# Patient Record
Sex: MALE | Race: WHITE | NOT HISPANIC OR LATINO | Employment: FULL TIME | URBAN - NONMETROPOLITAN AREA
[De-identification: names, ages, dates, MRNs, and addresses within clinical notes are randomized per-mention and may not be internally consistent; named-entity substitution may affect disease eponyms.]

---

## 2019-04-30 ENCOUNTER — OFFICE VISIT (OUTPATIENT)
Dept: NEUROLOGY | Facility: CLINIC | Age: 54
End: 2019-04-30

## 2019-04-30 VITALS
BODY MASS INDEX: 30.94 KG/M2 | OXYGEN SATURATION: 96 % | HEART RATE: 102 BPM | DIASTOLIC BLOOD PRESSURE: 78 MMHG | SYSTOLIC BLOOD PRESSURE: 120 MMHG | WEIGHT: 221 LBS | HEIGHT: 71 IN

## 2019-04-30 DIAGNOSIS — G47.33 OSA (OBSTRUCTIVE SLEEP APNEA): Primary | ICD-10-CM

## 2019-04-30 DIAGNOSIS — G47.19 EXCESSIVE DAYTIME SLEEPINESS: ICD-10-CM

## 2019-04-30 PROCEDURE — 99203 OFFICE O/P NEW LOW 30 MIN: CPT | Performed by: NURSE PRACTITIONER

## 2019-04-30 RX ORDER — OXYMETAZOLINE HYDROCHLORIDE 0.05 G/100ML
SPRAY NASAL
COMMUNITY

## 2019-05-16 ENCOUNTER — TELEPHONE (OUTPATIENT)
Dept: NEUROLOGY | Facility: CLINIC | Age: 54
End: 2019-05-16

## 2019-06-10 ENCOUNTER — APPOINTMENT (OUTPATIENT)
Dept: SLEEP MEDICINE | Facility: HOSPITAL | Age: 54
End: 2019-06-10

## 2021-09-17 ENCOUNTER — LAB (OUTPATIENT)
Dept: LAB | Facility: HOSPITAL | Age: 56
End: 2021-09-17

## 2021-09-17 ENCOUNTER — TRANSCRIBE ORDERS (OUTPATIENT)
Dept: LAB | Facility: HOSPITAL | Age: 56
End: 2021-09-17

## 2021-09-17 DIAGNOSIS — Z20.822 COVID-19 RULED OUT: Primary | ICD-10-CM

## 2021-09-17 DIAGNOSIS — Z20.822 COVID-19 RULED OUT: ICD-10-CM

## 2021-09-17 PROCEDURE — U0004 COV-19 TEST NON-CDC HGH THRU: HCPCS

## 2021-09-17 PROCEDURE — C9803 HOPD COVID-19 SPEC COLLECT: HCPCS

## 2021-09-18 LAB — SARS-COV-2 RNA NOSE QL NAA+PROBE: NOT DETECTED

## 2021-10-08 ENCOUNTER — OFFICE VISIT (OUTPATIENT)
Dept: UROLOGY | Facility: CLINIC | Age: 56
End: 2021-10-08

## 2021-10-08 VITALS
BODY MASS INDEX: 30.94 KG/M2 | WEIGHT: 221 LBS | HEIGHT: 71 IN | SYSTOLIC BLOOD PRESSURE: 126 MMHG | TEMPERATURE: 96.6 F | DIASTOLIC BLOOD PRESSURE: 70 MMHG | HEART RATE: 78 BPM | OXYGEN SATURATION: 96 %

## 2021-10-08 DIAGNOSIS — R97.20 ELEVATED PROSTATE SPECIFIC ANTIGEN (PSA): Primary | ICD-10-CM

## 2021-10-08 DIAGNOSIS — A63.0 GENITAL WARTS: ICD-10-CM

## 2021-10-08 LAB
BILIRUB BLD-MCNC: NEGATIVE MG/DL
CLARITY, POC: CLEAR
COLOR UR: YELLOW
EXPIRATION DATE: NORMAL
GLUCOSE UR STRIP-MCNC: NEGATIVE MG/DL
KETONES UR QL: NEGATIVE
LEUKOCYTE EST, POC: NEGATIVE
Lab: NORMAL
NITRITE UR-MCNC: NEGATIVE MG/ML
PH UR: 5.5 [PH] (ref 5–8)
PROT UR STRIP-MCNC: NEGATIVE MG/DL
RBC # UR STRIP: NEGATIVE /UL
SP GR UR: 1.02 (ref 1–1.03)
UROBILINOGEN UR QL: NORMAL

## 2021-10-08 PROCEDURE — 99203 OFFICE O/P NEW LOW 30 MIN: CPT | Performed by: UROLOGY

## 2021-10-08 RX ORDER — MOMETASONE FUROATE 50 UG/1
SPRAY, METERED NASAL
COMMUNITY
Start: 2021-09-29

## 2021-10-08 NOTE — PROGRESS NOTES
Chief Complaint  Elevated PSA    Referring Provider  Ange Caceres NP-C    HPI  Mr. Zhong is a 56 y.o.  male who presents with an elevated PSA to 4.7 on 8/29/21 .      Patient complains of nocturia.  His IPSS score is 3.     Patient denies fevers, chills, nausea, vomiting, constipation, or flank pain.  Past  history is negative for BPH, frequent UTIs, gross hematuria, stones or other renal diseases.     He denies shortness of breath, leg swelling, calf pain or bone pain.    Past Medical History  Past Medical History:   Diagnosis Date   • Eczema    • Hyperlipidemia    • Ringing in the ears, bilateral        Past Surgical History  Past Surgical History:   Procedure Laterality Date   • ACHILLES TENDON REPAIR     • CHOLECYSTECTOMY     • HERNIA REPAIR      two reported       Medications    Current Outpatient Medications:   •  mometasone (NASONEX) 50 MCG/ACT nasal spray, 2 SPRAYS IN EACH NOSTRIL NASALLY ONCE A DAY 30 DAY(S), Disp: , Rfl:   •  oxymetazoline (AFRIN) 0.05 % nasal spray, into the nostril(s) as directed by provider., Disp: , Rfl:     Allergies  No Known Allergies    Social History  Social History     Tobacco Use   • Smoking status: Never Smoker   • Smokeless tobacco: Current User     Types: Snuff   Vaping Use   • Vaping Use: Never used   Substance Use Topics   • Alcohol use: Yes     Comment: occ   • Drug use: No       Family History  Family History   Problem Relation Age of Onset   • Heart disease Mother    • Hypotension Mother    • Heart disease Father    • Hypertension Father    • Heart disease Maternal Grandmother    • Cancer Maternal Grandmother    • Heart disease Paternal Grandmother    • Hypertension Paternal Grandmother    • Heart disease Paternal Grandfather    • Hypertension Paternal Grandfather       He has no family history of prostate cancer.      Physical Exam  Visit Vitals  /70 (BP Location: Right arm, Patient Position: Sitting, Cuff Size: Adult)   Pulse 78   Temp 96.6 °F  "(35.9 °C)   Ht 180.3 cm (71\")   Wt 100 kg (221 lb)   SpO2 96%   BMI 30.82 kg/m²     A physical exam was notable for normal habitus.    Genitourinary  Penis: circumcised penis, glans normal, no penile discharge.  No rashes/lesions.    Testes: descended bilaterally, no masses, nontender to palpation. Remainder of scrotal contents normal. No hernia appreciated.  Perineum:  No perineal pain with palpation.  Rectal:  Normal tone, multiple genital warts present  Prostate:  30 grams.  Symmetric, non-tender, anodular and no induration.      Labs  Brief Urine Lab Results  (Last result in the past 365 days)      Color   Clarity   Blood   Leuk Est   Nitrite   Protein   CREAT   Urine HCG        10/08/21 1004 Yellow Clear Negative Negative Negative Negative               Lab Results   Component Value Date    PSA 1.22 08/11/2014         Assessment  Mr. Zhong is a 56 y.o.  male who presents with elevated PSA.  This is a new diagnosis of uncertain clinical prognosis.    I explained to the patient that an elevated PSA is a marker of risk of prostate cancer and a prostate biopsy would be the next step in diagnosis. I explained that sampling error can occur with any biopsy and there is a risk of potentially missing a cancer that may be present.    I discussed the risks, benefits, and alternatives to prostate biopsy, including hematuria, hematochezia, and hematospermia.  I also discussed the risk of diagnosing a clinically-insignificant prostate cancer.  I discussed the risks of sepsis, which can be minimized by prophylactic antibiotics.       Plan  1. Recheck PSA   "

## 2021-10-18 ENCOUNTER — LAB (OUTPATIENT)
Dept: LAB | Facility: HOSPITAL | Age: 56
End: 2021-10-18

## 2021-10-18 DIAGNOSIS — R97.20 ELEVATED PROSTATE SPECIFIC ANTIGEN (PSA): ICD-10-CM

## 2021-10-18 PROCEDURE — 84153 ASSAY OF PSA TOTAL: CPT

## 2021-10-18 PROCEDURE — 36415 COLL VENOUS BLD VENIPUNCTURE: CPT

## 2021-10-19 LAB — PSA SERPL-MCNC: 4.43 NG/ML (ref 0–4)

## 2021-10-29 ENCOUNTER — OFFICE VISIT (OUTPATIENT)
Dept: UROLOGY | Facility: CLINIC | Age: 56
End: 2021-10-29

## 2021-10-29 DIAGNOSIS — R97.20 ELEVATED PSA: Primary | ICD-10-CM

## 2021-10-29 PROCEDURE — 99442 PR PHYS/QHP TELEPHONE EVALUATION 11-20 MIN: CPT | Performed by: UROLOGY

## 2021-10-29 RX ORDER — CIPROFLOXACIN 500 MG/1
500 TABLET, FILM COATED ORAL 2 TIMES DAILY
Qty: 6 TABLET | Refills: 0 | Status: SHIPPED | OUTPATIENT
Start: 2021-10-29

## 2021-10-29 RX ORDER — CEPHALEXIN 500 MG/1
500 CAPSULE ORAL 2 TIMES DAILY
Qty: 6 CAPSULE | Refills: 0 | Status: SHIPPED | OUTPATIENT
Start: 2021-10-29 | End: 2021-11-01

## 2021-10-29 RX ORDER — MAGNESIUM HYDROXIDE 1200 MG/15ML
1 LIQUID ORAL ONCE
Qty: 1 ENEMA | Refills: 0 | Status: SHIPPED | OUTPATIENT
Start: 2021-10-29 | End: 2021-10-29

## 2021-10-29 NOTE — PROGRESS NOTES
Lab Results   Component Value Date    PSA 4.430 (H) 10/18/2021    PSA 1.22 08/11/2014         Assessment  Mr. Zhong is a 56 y.o.  male who presents with elevated PSA.  I had a 15-minute telephone conversation with the patient at home and myself in the car about his repeat PSA continues to be elevated.    I explained to the patient that an elevated PSA is a marker of risk of prostate cancer and a prostate biopsy would be the next step in diagnosis. I explained that sampling error can occur with any biopsy and there is a risk of potentially missing a cancer that may be present.    I discussed the risks, benefits, and alternatives to prostate biopsy, including hematuria, hematochezia, and hematospermia.  I also discussed the risk of diagnosing a clinically-insignificant prostate cancer.  I discussed the risks of sepsis, which can be minimized by prophylactic antibiotics.       Plan  1. I have recommended TRUS biopsy of prostate  2. I provided a prescription for Ciprofloxacin 500mg PO BID and Keflex 500mg PO BID for 3 days to start the day prior to biopsy.    You have chosen to receive care through a telephone visit. Do you consent to use a telephone visit for your medical care today? Yes

## 2021-11-02 ENCOUNTER — TELEPHONE (OUTPATIENT)
Dept: UROLOGY | Facility: CLINIC | Age: 56
End: 2021-11-02

## 2021-11-02 NOTE — TELEPHONE ENCOUNTER
I returned patient phone call and pt stated he has concerns because his friend has prostate cancer and he is doing imaging with dye to prevent cancer cells from spreading per patient. Patient wants to know if he can do some other testing because if he has cancer he does not want it to spread. I tried to explain to the patient that the biopsy is how we find out if there is cancer and it wouldn't cause cancer to spread to the other areas of his body by doing a biopsy. Please Advise.

## 2021-11-02 NOTE — TELEPHONE ENCOUNTER
Caller: OSBALDO FERNANDEZ    Relationship: Emergency Contact    Best call back number: [phone]    What is the best time to reach you: ANYTIME     Who are you requesting to speak with (clinical staff, provider,  specific staff member): CLINICAL STAFF OR DR. NAM    PTS WIFE WOULD LIKE A CALL BACK TO DISCUSS BIOPSY. THEY WOULD LIKE TO KNOW IF IT WOULD BE BETTER TO DO AN MRI SCAN EVERY 6 MONTHS INSTEAD TO AVOID INTRODUCING THE CANCER CELLS OUTSIDE OF THE BODY.     ALSO MR FERNANDEZ' PRESCRIPTIONS WERE ALL SENT INTO THE [facility] YESTERDAY. IF THE PT IS NEEDING THE BIOPSY AND NEEDS TO COMPLETE THESE MEDS BEFOREHAND THEY NEED TO BE SENT OVER TO [facility] [address] [place] [id number]

## 2021-11-03 NOTE — TELEPHONE ENCOUNTER
I do not see that patient is scheduled for biopsy with me.  We should either schedule him with biopsy as I previously recommended or he can do a telephone visit with me prior to scheduling and I can discuss this; I would be more than happy to explain it to him.

## 2021-11-08 ENCOUNTER — TELEPHONE (OUTPATIENT)
Dept: UROLOGY | Facility: CLINIC | Age: 56
End: 2021-11-08

## 2021-11-17 ENCOUNTER — TELEPHONE (OUTPATIENT)
Dept: UROLOGY | Facility: CLINIC | Age: 56
End: 2021-11-17

## 2021-11-17 NOTE — TELEPHONE ENCOUNTER
Caller: Tangela Zhong    Relationship: Self    Best call back number: [phone]    What is the best time to reach you: ANYTIME    Who are you requesting to speak with (clinical staff, provider,  specific staff member): DR. NAM OR HIS MA    What was the call regarding: PTS WIFE CALLED IN AGAIN STATING PT WOULD LIKE TO CHANGE OR MOVE UP HIS TELEPHONE VISIT WITH DR. NAM SO THEY COULD MOVE FORWARD WITH SCHED PTS BIOPSY. SPOKE WITH LOIS AT THE  CHANGED TELE HEALTH APPT. UPON INFORMING THE WIFE SHE STATED HER  NO LONGER HAS CONCERNS HE NEEDS TO DISCUSS WITH DR. NAM AND DOES NOT WANT A TELE HEALTH VISIT ANYMORE. PT WOULD JUST LIKE TO PROCEED WITH SCHEDULING THE BIOPSY AS SOON AS POSSIBLE.

## 2021-12-06 ENCOUNTER — PROCEDURE VISIT (OUTPATIENT)
Dept: UROLOGY | Facility: CLINIC | Age: 56
End: 2021-12-06

## 2021-12-06 VITALS — RESPIRATION RATE: 18 BRPM | BODY MASS INDEX: 30.94 KG/M2 | HEIGHT: 71 IN | WEIGHT: 221 LBS

## 2021-12-06 DIAGNOSIS — R97.20 ELEVATED PSA: Primary | ICD-10-CM

## 2021-12-06 PROCEDURE — 55700 PR PROSTATE NEEDLE BIOPSY ANY APPROACH: CPT | Performed by: UROLOGY

## 2021-12-06 PROCEDURE — 76872 US TRANSRECTAL: CPT | Performed by: UROLOGY

## 2021-12-06 RX ORDER — CEPHALEXIN 500 MG/1
CAPSULE ORAL
COMMUNITY
Start: 2021-12-03

## 2021-12-06 NOTE — PROGRESS NOTES
TRUS BIOPSY OF PROSTATE    Preoperative diagnosis  Elevated PSA    Postoperative diagnosis  Elevated PSA    Procedure  1.  Transrectal ultrasound of the prostate  2.  Transrectal ultrasound guidance of needle biopsy  3.  Prostate biopsy    Attending Surgeon  Parish Guerra MD    Anesthesia  2% lidocaine jelly, intrarectal instillation, 10mL  1% lidocaine solution, periprostatic injection, 10mL    Complications  None    Specimen  Prostate biopsy x 14    Indications  Mr. Zhong is a 56 y.o. year old male with an elevated PSA:   Lab Results   Component Value Date    PSA 4.430 (H) 10/18/2021    PSA 1.22 08/11/2014           After discussing his options, the patient decided to proceed with prostate biopsy.  Informed consent was obtained. Possible complications were discussed with the patient during his last visit including, but not limited to, hematuria, hematochezia, prostatitis, urinary tract infection, sepsis, and urinary retention.  He did start the prescribed oral antibiotic regimen.    Procedure  The patient was positioned and prepped in a left lateral position with lower extremities flexed.  Lidocaine jelly, 2%, was injected per rectum. A digital rectal exam was performed.The [employer] rectal ultrasound probe was slowly introduced into the rectum without difficulty.  The prostate and seminal vesicles were inspected systematically using cross and sagittal views with the ultrasound.  The dimensions of the prostate were measured, for a calculated volume of 29 mL.  Using a true cut 14 Fr biopsy needle, 14 prostate cores were collected. The specific locations were the following: left lateral base, left lateral mid, left lateral apex, left medial base, left medial mid, and left medial apex, right lateral base, right lateral mid, right lateral apex, right medial base, right medial mid, right medial apex, and right and left transition zones. The rectal ultrasound probe was removed.  A RIGO was again performed revealing  little blood in the rectum.  The patient tolerated the procedure well.    Plan  1.  The patient was instructed to drink plenty of fluids and warned about possible complications and side effects including, but not limited to, blood in the urine, stool and semen as well as bloodstream infection.  He was instructed to call the office if there are any issues, especially fevers or flu-like symptoms.    2.  Continue antibiotic for a total of 3 days.  3.  The patient will return to clinic in approximately 1 week for discussion of the pathological report.

## 2021-12-16 ENCOUNTER — OFFICE VISIT (OUTPATIENT)
Dept: UROLOGY | Facility: CLINIC | Age: 56
End: 2021-12-16

## 2021-12-16 DIAGNOSIS — R97.20 ELEVATED PSA: Primary | ICD-10-CM

## 2021-12-16 PROCEDURE — 99442 PR PHYS/QHP TELEPHONE EVALUATION 11-20 MIN: CPT | Performed by: UROLOGY

## 2022-05-02 ENCOUNTER — TELEPHONE (OUTPATIENT)
Dept: UROLOGY | Facility: CLINIC | Age: 57
End: 2022-05-02

## 2022-05-02 NOTE — TELEPHONE ENCOUNTER
Caller: JUVENCIO    Relationship: Provider    Best call back number: [phone]    What is the best time to reach you: ANY    Who are you requesting to speak with clinical staff    Do you know the name of the person who called: JUVENCIO WITH [facility] [phone]    What was the call regarding: [facility] CALLED TO HAVE PT A SOONER APPT THAN THE RECOMMENDED 1YR FOLLOW UP    Do you require a callback: YES

## 2022-05-06 ENCOUNTER — OFFICE VISIT (OUTPATIENT)
Dept: UROLOGY | Facility: CLINIC | Age: 57
End: 2022-05-06

## 2022-05-06 VITALS
HEART RATE: 86 BPM | HEIGHT: 71 IN | WEIGHT: 221 LBS | DIASTOLIC BLOOD PRESSURE: 84 MMHG | BODY MASS INDEX: 30.94 KG/M2 | SYSTOLIC BLOOD PRESSURE: 122 MMHG | TEMPERATURE: 97.5 F | OXYGEN SATURATION: 96 %

## 2022-05-06 DIAGNOSIS — R97.20 ELEVATED PSA: Primary | ICD-10-CM

## 2022-05-06 PROCEDURE — 99213 OFFICE O/P EST LOW 20 MIN: CPT | Performed by: UROLOGY

## 2022-05-06 RX ORDER — B-COMPLEX WITH VITAMIN C
TABLET ORAL DAILY
COMMUNITY

## 2022-05-06 RX ORDER — RIBOFLAVIN (VITAMIN B2) 100 MG
100 TABLET ORAL DAILY
COMMUNITY

## 2022-05-06 NOTE — PROGRESS NOTES
Chief Complaint   Patient presents with   • Elevated PSA     6 month follow up.        HPI  Ms. Zhong is a 57 y.o. male with history below in assessment, who presents for follow up.     IPSS Questionnaire (AUA-7):  Incomplete emptying  Over the past month, how often have you had a sensation of not emptying your bladder completely after you finish?: Less than 1 time in 5 (05/06/22 1054)  Frequency  Over the past month, how often have you had to urinate again less than two hours after you finishing urinating ?: Less than 1 time in 5 (05/06/22 1054)  Intermittency  Over the past month, how often have you found you stopped and started again several time when you urinated ?: Not at all (05/06/22 1054)  Urgency  Over the last month, how difficult  have you found it to postpone urination ?: Less than 1 time in 5 (05/06/22 1054)  Weak Stream  Over the past month, how often have you had a weak urinary stream ?: Not at all (05/06/22 1054)  Straining  Over the past month, how often have you had to push or strain to begin urination ?: Not at all (05/06/22 1054)  Nocturia  Over the past month, how many times did you most typically get up to urinate from the time you went to bed until the time you got up in the morning ?: Less than 1 time in 5 (05/06/22 1054)  Quality of life due to urinary symptoms  If you were to spend the rest of your life with your urinary condition the way it is now, how would feel about that?: Pleased (05/06/22 1054)    Scores  Total IPSS Score: (!) 4 (05/06/22 1054)  Total Score = Symtomatic Level: Mildly symptomatic: 0-7 (05/06/22 1054)         Past Medical History:   Diagnosis Date   • Eczema    • Hyperlipidemia    • Ringing in the ears, bilateral        Past Surgical History:   Procedure Laterality Date   • ACHILLES TENDON REPAIR      right   • CHOLECYSTECTOMY     • HERNIA REPAIR      two reported         Current Outpatient Medications:   •  ascorbic acid (VITAMIN C) 100 MG tablet, Take 100 mg by mouth  "Daily., Disp: , Rfl:   •  Magnesium 100 MG tablet, Take  by mouth Daily., Disp: , Rfl:   •  mometasone (NASONEX) 50 MCG/ACT nasal spray, 2 SPRAYS IN EACH NOSTRIL NASALLY ONCE A DAY 30 DAY(S), Disp: , Rfl:   •  Zinc 100 MG tablet, Take  by mouth Daily., Disp: , Rfl:   •  cephalexin (KEFLEX) 500 MG capsule, , Disp: , Rfl:   •  ciprofloxacin (Cipro) 500 MG tablet, Take 1 tablet by mouth 2 (Two) Times a Day. Start taking the day prior to biopsy, Disp: 6 tablet, Rfl: 0  •  oxymetazoline (AFRIN) 0.05 % nasal spray, into the nostril(s) as directed by provider., Disp: , Rfl:      Physical Exam  Visit Vitals  /84 (BP Location: Left arm, Patient Position: Sitting, Cuff Size: Adult)   Pulse 86   Temp 97.5 °F (36.4 °C) (Infrared)   Ht 180.3 cm (70.98\")   Wt 100 kg (221 lb)   SpO2 96%   BMI 30.84 kg/m²       Labs  Brief Urine Lab Results  (Last result in the past 365 days)      Color   Clarity   Blood   Leuk Est   Nitrite   Protein   CREAT   Urine HCG        10/08/21 1004 Yellow   Clear   Negative   Negative   Negative   Negative                 Lab Results   Component Value Date    GLUCOSE 97 08/11/2014    CALCIUM 9.3 07/12/2018     07/12/2018    K 4.8 07/12/2018    CO2 24 07/12/2018     07/12/2018    BUN 11 07/12/2018    CREATININE 0.9 07/12/2018    BCR 12.0 07/12/2018    ANIONGAP 16 08/11/2014       Lab Results   Component Value Date    WBC 10.6 08/11/2014    HGB 16.1 08/11/2014    HCT 47 08/11/2014    MCV 82.8 08/11/2014     08/11/2014            Lab Results   Component Value Date    PSA 4.430 (H) 10/18/2021    PSA 1.22 08/11/2014   PSA was checked recently and is stable          Radiographic Studies  No Images in the past 120 days found..      I have reviewed above labs and imaging.     Assessment  57 y.o. male with history of elevated PSA, status post negative prostate biopsy 6 months ago. Stable at this point.    Plan  1. FU in 1 yr w/ free + total PSA. If rises we will get MRI    "

## 2022-08-16 NOTE — PROGRESS NOTES
Patient: Tangela Zhong    YOB: 1965    Date: 08/17/2022    Primary Care Provider: Peter Babb MD    Chief Complaint   Patient presents with   • Difficulty Swallowing       SUBJECTIVE:    History of present illness:  I saw the patient in the office  today as a consultation for evaluation and treatment of dysphagia.  He complains of trouble swallowing, when eating. States he has noticed it more over the last couple years, not consistently.  Patient also has issues with reflux symptoms, occasional problems spasms and epigastric pain.  No nausea or vomiting.  No melena or dark stools.  Patient had multiple negative Cologuard in the last 2 years and does not wish to schedule a colonoscopy at this time.    The following portions of the patient's history were reviewed and updated as appropriate: allergies, current medications, past family history, past medical history, past social history, past surgical history and problem list.    Review of Systems   Constitutional: Negative for chills, fever and unexpected weight change.   HENT: Positive for trouble swallowing. Negative for voice change.    Eyes: Negative for visual disturbance.   Respiratory: Negative for apnea, cough, chest tightness, shortness of breath and wheezing.    Cardiovascular: Negative for chest pain, palpitations and leg swelling.   Gastrointestinal: Negative for abdominal distention, abdominal pain, anal bleeding, blood in stool, constipation, diarrhea, nausea, rectal pain and vomiting.   Endocrine: Negative for cold intolerance and heat intolerance.   Genitourinary: Negative for difficulty urinating, dysuria, flank pain, scrotal swelling and testicular pain.   Musculoskeletal: Negative for back pain, gait problem and joint swelling.   Skin: Negative for color change, rash and wound.   Neurological: Negative for dizziness, syncope, speech difficulty, weakness, numbness and headaches.   Hematological: Negative for adenopathy. Does not  bruise/bleed easily.   Psychiatric/Behavioral: Negative for confusion. The patient is not nervous/anxious.        History:  Past Medical History:   Diagnosis Date   • Eczema    • Hyperlipidemia    • Ringing in the ears, bilateral        Past Surgical History:   Procedure Laterality Date   • ACHILLES TENDON REPAIR      right   • CHOLECYSTECTOMY     • HERNIA REPAIR      two reported       Family History   Problem Relation Age of Onset   • COPD Mother    • Heart disease Mother    • Hypotension Mother    • Heart disease Father    • Hypertension Father    • Liver cancer Father    • Heart disease Maternal Grandmother    • Cancer Maternal Grandmother    • Heart disease Paternal Grandmother    • Hypertension Paternal Grandmother    • Heart disease Paternal Grandfather    • Hypertension Paternal Grandfather        Social History     Tobacco Use   • Smoking status: Never Smoker   • Smokeless tobacco: Current User     Types: Snuff   Vaping Use   • Vaping Use: Never used   Substance Use Topics   • Alcohol use: Yes     Comment: occ   • Drug use: No       Allergies:  No Known Allergies    Medications:    Current Outpatient Medications:   •  mometasone (NASONEX) 50 MCG/ACT nasal spray, 2 SPRAYS IN EACH NOSTRIL NASALLY ONCE A DAY 30 DAY(S), Disp: , Rfl:   •  ascorbic acid (VITAMIN C) 100 MG tablet, Take 100 mg by mouth Daily., Disp: , Rfl:   •  cephalexin (KEFLEX) 500 MG capsule, , Disp: , Rfl:   •  ciprofloxacin (Cipro) 500 MG tablet, Take 1 tablet by mouth 2 (Two) Times a Day. Start taking the day prior to biopsy, Disp: 6 tablet, Rfl: 0  •  Magnesium 100 MG tablet, Take  by mouth Daily., Disp: , Rfl:   •  oxymetazoline (AFRIN) 0.05 % nasal spray, into the nostril(s) as directed by provider., Disp: , Rfl:   •  Zinc 100 MG tablet, Take  by mouth Daily., Disp: , Rfl:     OBJECTIVE:    Vital Signs:   Vitals:    08/17/22 0903   BP: 122/86   Pulse: 76   Resp: 16   Temp: 97.5 °F (36.4 °C)   TempSrc: Temporal   SpO2: 98%   Weight: 93 kg  "(205 lb)   Height: 180.3 cm (70.98\")       Physical Exam:   General Appearance:    Alert, cooperative, in no acute distress   Head:    Normocephalic, without obvious abnormality, atraumatic   Eyes:            Lids and lashes normal, conjunctivae and sclerae normal, no   icterus, no pallor, corneas clear, PERRLA   Ears:    Ears appear intact with no abnormalities noted   Throat:   No oral lesions, no thrush, oral mucosa moist   Neck:   No adenopathy, supple, trachea midline, no thyromegaly, no   carotid bruit, no JVD   Lungs:     Clear to auscultation,respirations regular, even and                  unlabored    Heart:    Regular rhythm and normal rate, normal S1 and S2, no            murmur, no gallop, no rub, no click   Chest Wall:    No abnormalities observed   Abdomen:     Normal bowel sounds, no masses, no organomegaly, soft        non-tender, non-distended, no guarding, there is evidence of epigastric  Tenderness.  Prominent xiphoid process, no abdominal wall masses or hernias.  No tenderness.   Extremities:   Moves all extremities well, no edema, no cyanosis, no             redness   Pulses:   Pulses palpable and equal bilaterally   Skin:   No bleeding, bruising or rash   Lymph nodes:   No palpable adenopathy   Neurologic:   Cranial nerves 2 - 12 grossly intact, sensation intact     Results Review:   I reviewed the patient's new clinical results.    Review of Systems was reviewed and confirmed as accurate as documented by the MA.    ASSESSMENT/PLAN:    1. Gastroesophageal reflux disease, unspecified whether esophagitis present    2. Oral phase dysphagia        I did have a detailed and extensive discussion with the patient in the office and they understand that they need to undergo upper endoscopy with possible dilatation. Full risks and benefits of operative versus nonoperative intervention were discussed with the patient and these include bleeding and esophageal injury. The patient understands, agrees, and " wishes to proceed with the surgical treatment plan as mentioned above. The patient had no questions for me at the end of the discussion.  Avoid hard meats and dry bread.  Also limit caffeine alcohol and nicotine.     I discussed the patients findings and my recommendations with patient.     Electronically signed by Senait Ramirez MD  08/17/22 14:42 EDT

## 2022-08-17 ENCOUNTER — OFFICE VISIT (OUTPATIENT)
Dept: SURGERY | Facility: CLINIC | Age: 57
End: 2022-08-17

## 2022-08-17 VITALS
RESPIRATION RATE: 16 BRPM | OXYGEN SATURATION: 98 % | WEIGHT: 205 LBS | HEART RATE: 76 BPM | BODY MASS INDEX: 28.7 KG/M2 | DIASTOLIC BLOOD PRESSURE: 86 MMHG | HEIGHT: 71 IN | TEMPERATURE: 97.5 F | SYSTOLIC BLOOD PRESSURE: 122 MMHG

## 2022-08-17 DIAGNOSIS — K21.9 GASTROESOPHAGEAL REFLUX DISEASE, UNSPECIFIED WHETHER ESOPHAGITIS PRESENT: Primary | ICD-10-CM

## 2022-08-17 DIAGNOSIS — R13.11 ORAL PHASE DYSPHAGIA: ICD-10-CM

## 2022-08-17 PROCEDURE — 99203 OFFICE O/P NEW LOW 30 MIN: CPT | Performed by: SURGERY

## 2022-08-18 ENCOUNTER — TELEPHONE (OUTPATIENT)
Dept: SURGERY | Facility: CLINIC | Age: 57
End: 2022-08-18

## 2022-08-22 NOTE — TELEPHONE ENCOUNTER
[place] [facility] [place] for EGD, follow up appt r/s to 9/14/22 at 9:10, pt aware

## 2022-08-31 ENCOUNTER — TELEPHONE (OUTPATIENT)
Dept: SURGERY | Facility: CLINIC | Age: 57
End: 2022-08-31

## 2022-09-06 ENCOUNTER — OUTSIDE FACILITY SERVICE (OUTPATIENT)
Dept: SURGERY | Facility: CLINIC | Age: 57
End: 2022-09-06

## 2022-09-06 PROCEDURE — 43239 EGD BIOPSY SINGLE/MULTIPLE: CPT | Performed by: SURGERY

## 2022-09-15 NOTE — PROGRESS NOTES
Patient: Tangela Zhong    YOB: 1965    Date: 09/16/2022    Primary Care Provider: Peter Babb MD    Reason for Consultation: Follow-up EGD    Chief Complaint   Patient presents with   • Post-op     EGD/ Biopsy       History of present illness:  I saw the patient in the office today as a followup from their recent EGD with biopsy, the pathology report did show mild active gastritis.  They state that they have done well.  Patient has intermittent reflux symptoms, not severe in nature.  Patient also with a large soft tissue mass under the right scapula with very painful and catches under the shoulder at times.  Like to have removed.  It has increased in size considerably over the last few months.    The following portions of the patient's history were reviewed and updated as appropriate: allergies, current medications, past family history, past medical history, past social history, past surgical history and problem list.    Review of Systems   Constitutional: Negative for chills, fever and unexpected weight change.   HENT: Negative for trouble swallowing and voice change.    Eyes: Negative for visual disturbance.   Respiratory: Negative for apnea, cough, chest tightness, shortness of breath and wheezing.    Cardiovascular: Negative for chest pain, palpitations and leg swelling.   Gastrointestinal: Negative for abdominal distention, abdominal pain, anal bleeding, blood in stool, constipation, diarrhea, nausea, rectal pain and vomiting.   Endocrine: Negative for cold intolerance and heat intolerance.   Genitourinary: Negative for difficulty urinating, dysuria, flank pain, scrotal swelling and testicular pain.   Musculoskeletal: Negative for back pain, gait problem and joint swelling.   Skin: Negative for color change, rash and wound.   Neurological: Negative for dizziness, syncope, speech difficulty, weakness, numbness and headaches.   Hematological: Negative for adenopathy. Does not bruise/bleed  "easily.   Psychiatric/Behavioral: Negative for confusion. The patient is not nervous/anxious.        Vital Signs:   Vitals:    09/16/22 1303   BP: 118/88   BP Location: Right arm   Patient Position: Sitting   Cuff Size: Adult   Pulse: 71   Temp: 98 °F (36.7 °C)   SpO2: 98%   Weight: 93.9 kg (207 lb)   Height: 180.3 cm (71\")       Allergies:  No Known Allergies    Medications:    Current Outpatient Medications:   •  mometasone (NASONEX) 50 MCG/ACT nasal spray, 2 SPRAYS IN EACH NOSTRIL NASALLY ONCE A DAY 30 DAY(S), Disp: , Rfl:   •  ascorbic acid (VITAMIN C) 100 MG tablet, Take 100 mg by mouth Daily., Disp: , Rfl:   •  cephalexin (KEFLEX) 500 MG capsule, , Disp: , Rfl:   •  ciprofloxacin (Cipro) 500 MG tablet, Take 1 tablet by mouth 2 (Two) Times a Day. Start taking the day prior to biopsy, Disp: 6 tablet, Rfl: 0  •  Magnesium 100 MG tablet, Take  by mouth Daily., Disp: , Rfl:   •  oxymetazoline (AFRIN) 0.05 % nasal spray, into the nostril(s) as directed by provider., Disp: , Rfl:   •  Zinc 100 MG tablet, Take  by mouth Daily., Disp: , Rfl:     Physical Exam:   General Appearance:    Alert, cooperative, in no acute distress   Abdomen:     no masses, no organomegaly, soft with minimal epigastric tenderness.  No rebound or guarding.  No abdominal wall hernias.   Chest:      Clear to ausculation.  10 cm mass is mobile well-circumscribed and fairly deep in the muscle on the right shoulder below the scapula.  Tender to palpation.            Cor:  Regular rate and rhythm      Results Review:   I reviewed the patient's new clinical results.    Review of Systems was reviewed and confirmed as accurate as documented by the MA.    Assessment / Plan:    1. Mass of soft tissue of shoulder    2. Gastroesophageal reflux disease with esophagitis without hemorrhage        I did discuss the situation with the patient today in the office and they have done well from their recent EGD with biopsy. I have told the patient to start taking " Pepcid Complete as needed for occasional heartburn.  Patient scheduled for excision soft tissue mass intramuscular.  Risk of bleeding, infection and recurrence discussed and patient agreeable..    Electronically signed by Senait Ramirez MD  09/16/22

## 2022-09-16 ENCOUNTER — OFFICE VISIT (OUTPATIENT)
Dept: SURGERY | Facility: CLINIC | Age: 57
End: 2022-09-16

## 2022-09-16 VITALS
HEART RATE: 71 BPM | DIASTOLIC BLOOD PRESSURE: 88 MMHG | SYSTOLIC BLOOD PRESSURE: 118 MMHG | OXYGEN SATURATION: 98 % | BODY MASS INDEX: 28.98 KG/M2 | HEIGHT: 71 IN | TEMPERATURE: 98 F | WEIGHT: 207 LBS

## 2022-09-16 DIAGNOSIS — K21.00 GASTROESOPHAGEAL REFLUX DISEASE WITH ESOPHAGITIS WITHOUT HEMORRHAGE: ICD-10-CM

## 2022-09-16 DIAGNOSIS — M79.89 MASS OF SOFT TISSUE OF SHOULDER: Primary | ICD-10-CM

## 2022-09-16 PROCEDURE — 99213 OFFICE O/P EST LOW 20 MIN: CPT | Performed by: SURGERY

## 2022-09-16 RX ORDER — SODIUM CHLORIDE 0.9 % (FLUSH) 0.9 %
10 SYRINGE (ML) INJECTION EVERY 12 HOURS SCHEDULED
Status: CANCELLED | OUTPATIENT
Start: 2022-09-16

## 2022-09-16 RX ORDER — SODIUM CHLORIDE 0.9 % (FLUSH) 0.9 %
10 SYRINGE (ML) INJECTION AS NEEDED
Status: CANCELLED | OUTPATIENT
Start: 2022-09-16

## 2022-09-20 ENCOUNTER — OUTSIDE FACILITY SERVICE (OUTPATIENT)
Dept: SURGERY | Facility: CLINIC | Age: 57
End: 2022-09-20

## 2022-09-20 PROCEDURE — 23073 EXC SHOULDER TUM DEEP 5 CM/>: CPT | Performed by: SURGERY

## 2022-09-29 NOTE — PROGRESS NOTES
"Patient: Tangela Zhong    YOB: 1965    Date: 09/30/2022    Primary Care Provider: Peter Babb MD    Chief Complaint   Patient presents with   • Follow-up     Excision right shoulder       History of present illness:  I saw the patient in the office today as a followup from their recent lesion excision on the right scapula, the pathology report did show lipoma.  They state that they have done well and are having no problems.    Vital Signs:  Vitals:    09/30/22 1425   BP: 102/74   Pulse: 83   Resp: 16   Temp: 98.6 °F (37 °C)   TempSrc: Temporal   SpO2: 98%   Height: 180.3 cm (71\")       Physical Exam:   General Appearance:    Alert, cooperative, in no acute distress, wound clean dry without infection   Abdomen:     no masses, no organomegaly, soft non-tender, non-distended, no guarding, wounds are well healed   Chest:      Clear to ausculation  Head- maxillary sinuses very tender to palpation.  Slightly swollen.       Assessment / Plan:    1. Postoperative visit    2. Subacute maxillary sinusitis        I did discuss the situation with the patient today in the office and they have done well from their recent lesion excision, I don't think that the patient needs any further intervention and I need to see them back only if they have further problems. Pathology report was reviewed with the patient in the office.  Augmentin called in for sinusitis.    Electronically signed by Senait Ramirez MD  09/30/22                      "

## 2022-09-30 ENCOUNTER — OFFICE VISIT (OUTPATIENT)
Dept: SURGERY | Facility: CLINIC | Age: 57
End: 2022-09-30

## 2022-09-30 VITALS
DIASTOLIC BLOOD PRESSURE: 74 MMHG | BODY MASS INDEX: 28.87 KG/M2 | HEIGHT: 71 IN | HEART RATE: 83 BPM | SYSTOLIC BLOOD PRESSURE: 102 MMHG | RESPIRATION RATE: 16 BRPM | OXYGEN SATURATION: 98 % | TEMPERATURE: 98.6 F

## 2022-09-30 DIAGNOSIS — J01.00 SUBACUTE MAXILLARY SINUSITIS: ICD-10-CM

## 2022-09-30 DIAGNOSIS — Z48.89 POSTOPERATIVE VISIT: Primary | ICD-10-CM

## 2022-09-30 PROCEDURE — 99024 POSTOP FOLLOW-UP VISIT: CPT | Performed by: SURGERY

## 2022-09-30 RX ORDER — AMOXICILLIN AND CLAVULANATE POTASSIUM 875; 125 MG/1; MG/1
1 TABLET, FILM COATED ORAL 2 TIMES DAILY
Qty: 14 TABLET | Refills: 0 | Status: SHIPPED | OUTPATIENT
Start: 2022-09-30

## 2022-12-06 ENCOUNTER — TELEPHONE (OUTPATIENT)
Dept: SURGERY | Facility: CLINIC | Age: 57
End: 2022-12-06

## 2023-05-04 ENCOUNTER — LAB (OUTPATIENT)
Dept: LAB | Facility: HOSPITAL | Age: 58
End: 2023-05-04
Payer: COMMERCIAL

## 2023-05-04 PROCEDURE — 84154 ASSAY OF PSA FREE: CPT | Performed by: UROLOGY

## 2023-05-04 PROCEDURE — 84153 ASSAY OF PSA TOTAL: CPT | Performed by: UROLOGY

## 2023-05-08 ENCOUNTER — OFFICE VISIT (OUTPATIENT)
Dept: UROLOGY | Facility: CLINIC | Age: 58
End: 2023-05-08
Payer: COMMERCIAL

## 2023-05-08 VITALS
HEIGHT: 71 IN | OXYGEN SATURATION: 97 % | SYSTOLIC BLOOD PRESSURE: 124 MMHG | WEIGHT: 207.4 LBS | HEART RATE: 78 BPM | TEMPERATURE: 97.9 F | BODY MASS INDEX: 29.03 KG/M2 | DIASTOLIC BLOOD PRESSURE: 78 MMHG

## 2023-05-08 DIAGNOSIS — R97.20 ELEVATED PSA: Primary | ICD-10-CM

## 2023-05-08 PROCEDURE — 99213 OFFICE O/P EST LOW 20 MIN: CPT | Performed by: UROLOGY

## 2023-05-08 RX ORDER — TRIAMCINOLONE ACETONIDE 55 UG/1
2 SPRAY, METERED NASAL DAILY
Qty: 60 EACH | Refills: 5 | COMMUNITY
Start: 2023-03-22 | End: 2023-09-18

## 2023-05-08 NOTE — PROGRESS NOTES
Chief Complaint   Patient presents with   • Elevated PSA     1 year follow up had labwork          HPI  Mr. Zhong is a 58 y.o. male with history below in assessment, who presents for follow up.     At this visit patient had labs done last week and they are not back yet    Past Medical History:   Diagnosis Date   • Eczema    • Hyperlipidemia    • Ringing in the ears, bilateral        Past Surgical History:   Procedure Laterality Date   • ACHILLES TENDON REPAIR      right   • CHOLECYSTECTOMY     • HERNIA REPAIR      two reported         Current Outpatient Medications:   •  ascorbic acid (VITAMIN C) 100 MG tablet, Take 100 mg by mouth Daily., Disp: , Rfl:   •  mometasone (NASONEX) 50 MCG/ACT nasal spray, 2 SPRAYS IN EACH NOSTRIL NASALLY ONCE A DAY 30 DAY(S), Disp: , Rfl:      Physical Exam  There were no vitals taken for this visit.    Labs  Brief Urine Lab Results     None          Lab Results   Component Value Date    GLUCOSE 97 08/11/2014    CALCIUM 9.3 07/12/2018     07/12/2018    K 4.8 07/12/2018    CO2 24 07/12/2018     07/12/2018    BUN 11 07/12/2018    CREATININE 0.9 07/12/2018    BCR 12.0 07/12/2018    ANIONGAP 16 08/11/2014       Lab Results   Component Value Date    WBC 10.6 08/11/2014    HGB 16.1 08/11/2014    HCT 47 08/11/2014    MCV 82.8 08/11/2014     08/11/2014            Lab Results   Component Value Date    PSA 4.430 (H) 10/18/2021    PSA 1.22 08/11/2014             Radiographic Studies  No Images in the past 120 days found..        I have reviewed above labs and imaging.     Assessment  58 y.o. male with history of elevated PSA, status post negative prostate biopsy in 2021.  Labs are not back yet.    Plan  1.  PSA total and free  2.  Follow-up telephone in 1 to 2 weeks    
No

## 2023-05-18 ENCOUNTER — OFFICE VISIT (OUTPATIENT)
Dept: UROLOGY | Facility: CLINIC | Age: 58
End: 2023-05-18
Payer: COMMERCIAL

## 2023-05-18 DIAGNOSIS — R97.20 ELEVATED PSA: Primary | ICD-10-CM

## 2023-05-18 NOTE — PROGRESS NOTES
57 yo M w/ history of elevated PSA, status post negative prostate biopsy in 2021.    Lab Results   Component Value Date    PSA 3.7 05/04/2023    PSA 4.430 (H) 10/18/2021    PSA 1.22 08/11/2014     6-minute telephone encounter.  His PSA is very stable.  We will have him follow-up with my SERGE in 1 year with PSA prior.    You have chosen to receive care through a telephone visit. Do you consent to use a telephone visit for your medical care today? Yes

## 2023-12-28 ENCOUNTER — TRANSCRIBE ORDERS (OUTPATIENT)
Dept: GENERAL RADIOLOGY | Facility: HOSPITAL | Age: 58
End: 2023-12-28
Payer: COMMERCIAL

## 2023-12-28 ENCOUNTER — HOSPITAL ENCOUNTER (OUTPATIENT)
Dept: GENERAL RADIOLOGY | Facility: HOSPITAL | Age: 58
Discharge: HOME | End: 2023-12-28
Admitting: FAMILY MEDICINE
Payer: COMMERCIAL

## 2023-12-28 DIAGNOSIS — R05.3 CHRONIC COUGH: ICD-10-CM

## 2023-12-28 DIAGNOSIS — R05.3 CHRONIC COUGH: Primary | ICD-10-CM

## 2023-12-28 PROCEDURE — 71046 X-RAY EXAM CHEST 2 VIEWS: CPT

## 2024-05-20 ENCOUNTER — LAB (OUTPATIENT)
Dept: LAB | Facility: HOSPITAL | Age: 59
End: 2024-05-20
Payer: COMMERCIAL

## 2024-05-20 ENCOUNTER — OFFICE VISIT (OUTPATIENT)
Dept: UROLOGY | Facility: CLINIC | Age: 59
End: 2024-05-20
Payer: COMMERCIAL

## 2024-05-20 VITALS
HEIGHT: 71 IN | WEIGHT: 211 LBS | DIASTOLIC BLOOD PRESSURE: 74 MMHG | OXYGEN SATURATION: 96 % | BODY MASS INDEX: 29.54 KG/M2 | SYSTOLIC BLOOD PRESSURE: 118 MMHG | TEMPERATURE: 98.2 F | HEART RATE: 81 BPM

## 2024-05-20 DIAGNOSIS — R97.20 ELEVATED PSA: Primary | ICD-10-CM

## 2024-05-20 DIAGNOSIS — R97.20 ELEVATED PSA: ICD-10-CM

## 2024-05-20 PROBLEM — Z87.442 HISTORY OF RENAL CALCULI: Status: ACTIVE | Noted: 2018-07-12

## 2024-05-20 PROBLEM — E78.2 MIXED HYPERLIPIDEMIA: Status: ACTIVE | Noted: 2018-08-13

## 2024-05-20 PROBLEM — A63.0 ANAL WARTS: Status: ACTIVE | Noted: 2018-08-13

## 2024-05-20 LAB
BILIRUB BLD-MCNC: NEGATIVE MG/DL
CLARITY, POC: CLEAR
COLOR UR: YELLOW
EXPIRATION DATE: ABNORMAL
GLUCOSE UR STRIP-MCNC: NEGATIVE MG/DL
KETONES UR QL: NEGATIVE
LEUKOCYTE EST, POC: NEGATIVE
Lab: ABNORMAL
NITRITE UR-MCNC: NEGATIVE MG/ML
PH UR: 5.5 [PH] (ref 5–8)
PROT UR STRIP-MCNC: NEGATIVE MG/DL
PSA SERPL-MCNC: 3.7 NG/ML (ref 0–4)
RBC # UR STRIP: ABNORMAL /UL
SP GR UR: 1.02 (ref 1–1.03)
UROBILINOGEN UR QL: NORMAL

## 2024-05-20 PROCEDURE — 99213 OFFICE O/P EST LOW 20 MIN: CPT | Performed by: NURSE PRACTITIONER

## 2024-05-20 PROCEDURE — 36415 COLL VENOUS BLD VENIPUNCTURE: CPT

## 2024-05-20 PROCEDURE — 81003 URINALYSIS AUTO W/O SCOPE: CPT | Performed by: NURSE PRACTITIONER

## 2024-05-20 PROCEDURE — 84153 ASSAY OF PSA TOTAL: CPT

## 2024-05-20 RX ORDER — CLOBETASOL PROPIONATE 0.5 MG/G
1 CREAM TOPICAL
COMMUNITY
Start: 2023-12-29

## 2024-05-20 RX ORDER — LOVASTATIN 20 MG/1
20 TABLET ORAL NIGHTLY
COMMUNITY
Start: 2023-05-05

## 2024-05-20 RX ORDER — FENOFIBRATE 145 MG/1
145 TABLET, COATED ORAL DAILY
COMMUNITY
Start: 2023-05-05

## 2024-05-20 NOTE — PROGRESS NOTES
Office Visit Established Male Patient     Patient Name: Tangela Zhong  : 1965   MRN: [id number]     Chief Complaint:   Chief Complaint   Patient presents with    Follow-up     No concerns       History of Present Illness: Mr. Tangela Zhong is a 59 y.o. male who presents today for follow up for elevated PSA.  Patient is s/p negative prostate biopsy in .  He does reports nocturia x1 but reports drinking around 32 ounces of water before bed.  He has been tested for DAVID and did not have DAVID.       IPSS Questionnaire (AUA-7):  Incomplete emptying  Over the past month, how often have you had a sensation of not emptying your bladder completely after you finished urinating?: Not at all (24 1020)  Frequency  Over the past month, how often have you had to urinate again less than two hours after you finishied urinating ?: Less than 1 time in 5 (24 1020)  Intermittency  Over the past month, how often have you found you stopped and started again several time when you urinated ?: Not at all (24 102)  Urgency  Over the last month, how often have you found it difficult  have you found it difficult to postpone urination ?: Less than 1 time in 5 (24 102)  Weak Stream  Over the past month, how often have you had a weak urinary stream ?: Not at all (24 102)  Straining  Over the past month, how often have you had to push or strain to begin urination ?: Not at all (24 102)  Nocturia  Over the past month, how many times did you most typically get up to urinate from the time you went to bed until the time you got up in the morning ?: 1 time (24 102)  Quality of life due to urinary symptoms  If you were to spend the rest of your life with your urinary condition the way it is now, how would feel about that?: Delighted (24 102)    Scores  Total IPSS Score: 3 (24 102)  Total Score = Symptomatic Level: Mildly symptomatic: 0-7 (24 102)        Subjective  "     Review of System:   As noted in HPI.    Past Medical History:   Past Medical History:   Diagnosis Date    Eczema     Hyperlipidemia     Ringing in the ears, bilateral        Past Surgical History:   Past Surgical History:   Procedure Laterality Date    ACHILLES TENDON REPAIR      right    CHOLECYSTECTOMY      HERNIA REPAIR      two reported       Family History:   Family History   Problem Relation Age of Onset    COPD Mother     Heart disease Mother     Hypotension Mother     Heart disease Father     Hypertension Father     Liver cancer Father     Heart disease Maternal Grandmother     Cancer Maternal Grandmother     Heart disease Paternal Grandmother     Hypertension Paternal Grandmother     Heart disease Paternal Grandfather     Hypertension Paternal Grandfather        Social History:   Social History     Socioeconomic History    Marital status:    Tobacco Use    Smoking status: Never     Passive exposure: Never    Smokeless tobacco: Current     Types: Snuff   Vaping Use    Vaping status: Never Used   Substance and Sexual Activity    Alcohol use: Yes     Comment: occ    Drug use: No    Sexual activity: Defer       Medications:     Current Outpatient Medications:     ascorbic acid (VITAMIN C) 100 MG tablet, Take 1 tablet by mouth Daily., Disp: , Rfl:     clobetasol propionate (TEMOVATE) 0.05 % cream, Apply 1 Application topically to the appropriate area as directed., Disp: , Rfl:     fenofibrate (TRICOR) 145 MG tablet, Take 1 tablet by mouth Daily., Disp: , Rfl:     lovastatin (MEVACOR) 20 MG tablet, Take 1 tablet by mouth Every Night., Disp: , Rfl:     Allergies:   No Known Allergies    Objective     Physical Exam:   Vital Signs:   Vitals:    05/20/24 1028   BP: 118/74   Pulse: 81   Temp: 98.2 °F (36.8 °C)   SpO2: 96%   Weight: 95.7 kg (211 lb)   Height: 180.3 cm (70.98\")     Body mass index is 29.44 kg/m².   Physical Exam  Vitals and nursing note reviewed.   Constitutional:       General: He is awake. " He is not in acute distress.     Appearance: He is not ill-appearing.   Pulmonary:      Effort: Pulmonary effort is normal.   Skin:     General: Skin is warm and dry.   Neurological:      Mental Status: He is alert and oriented to person, place, and time. Mental status is at baseline.   Psychiatric:         Mood and Affect: Mood normal.         Behavior: Behavior is cooperative.              Labs  Brief Urine Lab Results       None            Lab Results   Component Value Date    GLUCOSE 97 08/11/2014    CALCIUM 9.3 07/12/2018     07/12/2018    K 4.8 07/12/2018    CO2 24 07/12/2018     07/12/2018    BUN 11 07/12/2018    CREATININE 0.9 07/12/2018    BCR 12.0 07/12/2018    ANIONGAP 16 08/11/2014       Lab Results   Component Value Date    WBC 9.7 07/12/2018    HGB 15.7 07/12/2018    HCT 46.4 07/12/2018    MCV 83.2 07/12/2018     07/12/2018            Lab Results   Component Value Date    PSA 3.7 05/04/2023       No visits with results within 12 Month(s) from this visit.   Latest known visit with results is:   Results Encounter on 05/06/2023   Component Date Value Ref Range Status    PSA 05/04/2023 3.7  0.0 - 4.0 ng/mL Final    Roche ECLIA methodology.  According to the American Urological Association, Serum PSA should  decrease and remain at undetectable levels after radical  prostatectomy. The AUA defines biochemical recurrence as an initial  PSA value 0.2 ng/mL or greater followed by a subsequent confirmatory  PSA value 0.2 ng/mL or greater.  Values obtained with different assay methods or kits cannot be used  interchangeably. Results cannot be interpreted as absolute evidence  of the presence or absence of malignant disease.    PSA, Free 05/04/2023 0.54  N/A ng/mL Final    Roche ECLIA methodology.    PSA, Free % 05/04/2023 14.6  % Final    The table below lists the probability of prostate cancer for  men with non-suspicious RIGO results and total PSA between  4 and 10 ng/mL, by patient age  (Miguel et al, JERMAN 1998,  279:1542).                    % Free PSA       50-64 yr        65-75 yr                    0.00-10.00%        56%             55%                   10.01-15.00%        24%             35%                   15.01-20.00%        17%             23%                   20.01-25.00%        10%             20%                        >25.00%         5%              9%  Please note:  Miguel et al did not make specific                recommendations regarding the use of                percent free PSA for any other population                of men.           I have reviewed the above labs.        Assessment / Plan      Assessment:   Diagnoses and all orders for this visit:    1. Elevated PSA (Primary)  -     PSA Diagnostic; Future  -     PSA Diagnostic; Future         59 y.o. male presents for history of elevated PSA, s/p negative prostate biopsy in 2021.  Patient has no family history of prostate cancer.   He is doing well.  Does reports nocturia x1 which is most bothersome, IPSS 3 and delighted.  States he drinks 32 ounces of water before bed likely contributing to his nocturia.  Advised to stop fluids 3 hours prior to bedtime for nocturia.  No recent PSA available today, check today.  Repeat in 1 year prior to follow up.     Plan:    PSA today  PSA in 1 year  Follow up in 1 year with PSA prior    Follow Up:   Return in about 1 year (around 5/20/2025) for recheck with Josselin labs prior.      MYA Douglas  [facility]

## 2024-05-24 ENCOUNTER — TELEPHONE (OUTPATIENT)
Dept: UROLOGY | Facility: CLINIC | Age: 59
End: 2024-05-24

## 2024-05-24 NOTE — TELEPHONE ENCOUNTER
Hub staff attempted to follow warm transfer process and was unsuccessful     Caller: Tangela Zhong    Relationship to patient: Self    Best call back number: [phone]    Patient is needing: PT HAD LAB DONE ON 05.20.2024 AT [facility] HE WOULD LIKE FOR SOMEONE TO CALL HIM REGARDING THESE LAB RESULTS. THANK YOU

## 2024-05-28 ENCOUNTER — LAB (OUTPATIENT)
Dept: UROLOGY | Facility: CLINIC | Age: 59
End: 2024-05-28
Payer: COMMERCIAL

## 2024-05-28 ENCOUNTER — TELEPHONE (OUTPATIENT)
Dept: ORTHOPEDIC SURGERY | Facility: CLINIC | Age: 59
End: 2024-05-28
Payer: COMMERCIAL

## 2024-05-28 DIAGNOSIS — R39.9 LOWER URINARY TRACT SYMPTOMS (LUTS): Primary | ICD-10-CM

## 2024-05-28 NOTE — TELEPHONE ENCOUNTER
Left pt a VM in regards to not having any available work in appointments available today with Clem. If the patient had any other questions or concerns to contact us back.

## 2024-05-28 NOTE — TELEPHONE ENCOUNTER
Caller: OSBALDO FERNANDEZ    Relationship to patient: Emergency Contact    Best call back number: [phone]    Patient is needing: PATIENTS WIFE CALLED TO GET NEW PATIENT APPT AND ASKED IF THE PATIENT COULD BE WORKED IN TODAY. I HAVE THE PATIENT SCHEDULED FOR 5/30/24, BUT SHE IS WANTING TO SEE WHAT CAN BE DONE AND STATED THAT THEY CAN BE THERE IN AS SOON AS 20 MINUTES. PLEASE ADVISE. THEY WANT AGUS HARRY

## 2024-05-29 ENCOUNTER — TELEPHONE (OUTPATIENT)
Dept: ORTHOPEDIC SURGERY | Facility: CLINIC | Age: 59
End: 2024-05-29
Payer: COMMERCIAL

## 2024-05-29 LAB
APPEARANCE UR: CLEAR
BACTERIA #/AREA URNS HPF: NORMAL /[HPF]
BILIRUB UR QL STRIP: NEGATIVE
CASTS URNS QL MICRO: NORMAL /LPF
COLOR UR: YELLOW
EPI CELLS #/AREA URNS HPF: NORMAL /HPF (ref 0–10)
GLUCOSE UR QL STRIP: NEGATIVE
HGB UR QL STRIP: NEGATIVE
KETONES UR QL STRIP: NEGATIVE
LEUKOCYTE ESTERASE UR QL STRIP: NEGATIVE
MICRO URNS: NORMAL
MICRO URNS: NORMAL
NITRITE UR QL STRIP: NEGATIVE
PH UR STRIP: 5.5 [PH] (ref 5–7.5)
PROT UR QL STRIP: NEGATIVE
RBC #/AREA URNS HPF: NORMAL /HPF (ref 0–2)
SP GR UR STRIP: 1.01 (ref 1–1.03)
UROBILINOGEN UR STRIP-MCNC: 0.2 MG/DL (ref 0.2–1)
WBC #/AREA URNS HPF: NORMAL /HPF (ref 0–5)

## 2024-05-29 NOTE — PROGRESS NOTES
Please let patient know that there is no blood in his urine when looked at under a microscope.  No further workup needed.  Thanks.

## 2024-05-30 ENCOUNTER — OFFICE VISIT (OUTPATIENT)
Dept: ORTHOPEDIC SURGERY | Facility: CLINIC | Age: 59
End: 2024-05-30
Payer: COMMERCIAL

## 2024-05-30 VITALS — WEIGHT: 213.8 LBS | TEMPERATURE: 97.7 F | HEIGHT: 71 IN | BODY MASS INDEX: 29.93 KG/M2

## 2024-05-30 DIAGNOSIS — M67.52 PLICA SYNDROME OF LEFT KNEE: ICD-10-CM

## 2024-05-30 DIAGNOSIS — M17.12 PATELLOFEMORAL ARTHRITIS OF LEFT KNEE: ICD-10-CM

## 2024-05-30 DIAGNOSIS — M25.562 ACUTE PAIN OF LEFT KNEE: Primary | ICD-10-CM

## 2024-05-30 PROCEDURE — 99204 OFFICE O/P NEW MOD 45 MIN: CPT | Performed by: PHYSICIAN ASSISTANT

## 2024-05-30 RX ORDER — CELECOXIB 200 MG/1
CAPSULE ORAL
Qty: 14 CAPSULE | Refills: 0 | Status: SHIPPED | OUTPATIENT
Start: 2024-05-30

## 2024-05-30 NOTE — PROGRESS NOTES
Subjective   Patient ID: Tangela Zhong is a 59 y.o. right hand dominant male is being seen for orthopaedic evaluation today for left knee pain   Pain of the Left Knee (Reports 2 weeks ago working on a deck and noticing severe pain after. No known injury. 2 days ago was turning and heard a loud crunching sound with pain )       Pain  Associated symptoms include arthralgias (left knee).     Patient presents with left medial knee pain ongoing x 2 weeks.  There has been no injury or trauma.  He states he has been building a deck and noticed the symptoms around the time he began working.  2 days prior, he was standing in the kitchen with his foot planted and turned suddenly and heard a crunching sensation to the left knee.  There is no mechanical locking or instability of the left knee.                                                 Past Medical History:   Diagnosis Date    Eczema     Hyperlipidemia     Ringing in the ears, bilateral         Past Surgical History:   Procedure Laterality Date    ACHILLES TENDON REPAIR      right    CHOLECYSTECTOMY      HERNIA REPAIR      two reported       Family History   Problem Relation Age of Onset    COPD Mother     Heart disease Mother     Hypotension Mother     Heart disease Father     Hypertension Father     Liver cancer Father     Heart disease Maternal Grandmother     Cancer Maternal Grandmother     Heart disease Paternal Grandmother     Hypertension Paternal Grandmother     Heart disease Paternal Grandfather     Hypertension Paternal Grandfather         Social History     Socioeconomic History    Marital status:    Tobacco Use    Smoking status: Never     Passive exposure: Never    Smokeless tobacco: Current     Types: Snuff    Tobacco comments:     Smokeless non flavored   Vaping Use    Vaping status: Never Used   Substance and Sexual Activity    Alcohol use: Yes     Comment: Rarely    Drug use: No    Sexual activity: Yes     Partners: Female     Birth  "control/protection: Vasectomy, Surgical       No Known Allergies    Review of Systems   Musculoskeletal:  Positive for arthralgias (left knee).       Objective   Temp 97.7 °F (36.5 °C)   Ht 180.3 cm (70.98\")   Wt 97 kg (213 lb 12.8 oz)   BMI 29.84 kg/m²   Physical Exam  Vitals and nursing note reviewed.   Constitutional:       Appearance: Normal appearance.   Pulmonary:      Effort: Pulmonary effort is normal.   Musculoskeletal:      Left knee: No swelling, deformity, bony tenderness or crepitus. Tenderness (medial plica) present. No medial joint line, lateral joint line, MCL, LCL, ACL or patellar tendon tenderness. No LCL laxity or MCL laxity.Normal meniscus and normal patellar mobility.   Neurological:      Mental Status: He is alert and oriented to person, place, and time.       Left Knee Exam     Tenderness   The patient is experiencing tenderness in the medial retinaculum.    Range of Motion   Extension:  5   Flexion:  120     Tests   Pivot shift: negative    Other   Erythema: absent  Sensation: normal  Pulse: present    Comments:  Neg. Apley's test            Extremity DVT signs are negative on physical exam with negative Beatrice sign, no calf pain, no palpable cords, and no skin tone change   Neurologic Exam     Mental Status   Oriented to person, place, and time.            Assessment & Plan   Independent Review of Radiographic Studies:    X-ray of the left knee 3 view performed in the clinic independently reviewed for the evaluation of pain.  No comparison films available to review.  No acute fracture or dislocation.  There is medial joint space narrowing as well as patellofemoral degenerative narrowing most consistent with Kellgren-Inder grade 2.        Procedures  [x] No procedures were performed in office today.    Diagnoses and all orders for this visit:    1. Acute pain of left knee (Primary)  -     XR Knee 3 View Left  -     celecoxib (CeleBREX) 200 MG capsule; Take 1 capsule by mouth twice daily " x 2 days, then take 1 cap daily prn pain  Dispense: 14 capsule; Refill: 0    2. Plica syndrome of left knee  -     celecoxib (CeleBREX) 200 MG capsule; Take 1 capsule by mouth twice daily x 2 days, then take 1 cap daily prn pain  Dispense: 14 capsule; Refill: 0    3. Patellofemoral arthritis of left knee  -     celecoxib (CeleBREX) 200 MG capsule; Take 1 capsule by mouth twice daily x 2 days, then take 1 cap daily prn pain  Dispense: 14 capsule; Refill: 0       Discussion of orthopedic goals  Orthopedic activities reviewed and patient expressed appreciation  Risk, benefits, and merits of treatment alternatives reviewed with the patient and questions answered  Ice, heat, and/or modalities as beneficial  Reduced physical activity as appropriate and avoid offending activity  Use brace as instructed    Recommendations/Plan:  Exercise, medications, injections, other patient advice, and return appointment as noted.  Patient is encouraged to call or return for any issues or concerns.    We discussed the option of formal physical therapy versus a home exercise regimen.  He would like to try the home exercise program initially.  If no improvement in 2 to 4 weeks consider MRI of the left knee.    Patient was provided a hinged knee brace to wear temporarily.  Patient agreeable to call or return sooner for any concerns.

## 2024-08-12 NOTE — PROGRESS NOTES
Patient: Tangela Zhong    YOB: 1965    Date: 08/14/2024    Primary Care Provider: Peter Babb MD    Chief Complaint   Patient presents with    Cyst       SUBJECTIVE:    History of present illness:  The patient is in the office today for evaluation and treatment of a mass on the right side.  Patient has a pulling sensation discomfort increased in size.  Also has a cyst in the mid chest that he has drained several times with continued recurrence.      The following portions of the patient's history were reviewed and updated as appropriate: allergies, current medications, past family history, past medical history, past social history, past surgical history and problem list.      Review of Systems   Constitutional:  Negative for chills, fever and unexpected weight change.   HENT:  Negative for trouble swallowing and voice change.    Eyes:  Negative for visual disturbance.   Respiratory:  Negative for apnea, cough, chest tightness, shortness of breath and wheezing.    Cardiovascular:  Negative for chest pain, palpitations and leg swelling.   Gastrointestinal:  Negative for abdominal distention, abdominal pain, anal bleeding, blood in stool, constipation, diarrhea, nausea, rectal pain and vomiting.   Endocrine: Negative for cold intolerance and heat intolerance.   Genitourinary:  Negative for difficulty urinating, dysuria, flank pain, scrotal swelling and testicular pain.   Musculoskeletal:  Negative for back pain, gait problem and joint swelling.   Skin:  Negative for color change, rash and wound.   Neurological:  Negative for dizziness, syncope, speech difficulty, weakness, numbness and headaches.   Hematological:  Negative for adenopathy. Does not bruise/bleed easily.   Psychiatric/Behavioral:  Negative for confusion. The patient is not nervous/anxious.          Allergies:  No Known Allergies    Medications:    Current Outpatient Medications:     clobetasol propionate (TEMOVATE) 0.05 % cream,  "Apply 1 Application topically to the appropriate area as directed., Disp: , Rfl:     fenofibrate (TRICOR) 145 MG tablet, Take 1 tablet by mouth Daily., Disp: , Rfl:     lovastatin (MEVACOR) 20 MG tablet, Take 1 tablet by mouth Every Night., Disp: , Rfl:     ascorbic acid (VITAMIN C) 100 MG tablet, Take 1 tablet by mouth Daily. (Patient not taking: Reported on 8/14/2024), Disp: , Rfl:     History:  Past Medical History:   Diagnosis Date    Asthma sporadically through adulthood    Cholelithiasis 2002    Removed    Eczema     Hyperlipidemia     Rectal bleeding 2000    Hemorrhoids    Ringing in the ears, bilateral        Past Surgical History:   Procedure Laterality Date    ACHILLES TENDON REPAIR      right    CHOLECYSTECTOMY      HEMORRHOIDECTOMY      HERNIA REPAIR      two reported       Family History   Problem Relation Age of Onset    COPD Mother     Heart disease Mother     Hypotension Mother     Heart disease Father     Hypertension Father     Liver cancer Father     Heart disease Maternal Grandmother     Cancer Maternal Grandmother         Lung cancer/smoker    Heart disease Paternal Grandmother     Hypertension Paternal Grandmother     Heart disease Paternal Grandfather     Hypertension Paternal Grandfather        Social History     Tobacco Use    Smoking status: Never     Passive exposure: Never    Smokeless tobacco: Current     Types: Snuff    Tobacco comments:     Smokeless non flavored   Vaping Use    Vaping status: Never Used   Substance Use Topics    Alcohol use: Yes     Comment: Rarely    Drug use: No        OBJECTIVE:    Vital Signs:   Vitals:    08/14/24 0859   BP: 112/74   Pulse: 75   Temp: 98.6 °F (37 °C)   SpO2: 99%   Weight: 95.6 kg (210 lb 12.8 oz)   Height: 180.3 cm (70.98\")       Physical Exam:       Skin-3 cm cyst on the mid chest, fixed to the skin.  4 cm lipoma right upper abdomen.  Fixed to the skin and tender.  Multilobulated.    Results Review:   I reviewed the patient's new clinical " results.    Review of Systems was reviewed and confirmed as accurate as documented by the MA.    ASSESSMENT/PLAN:    1. Lipoma of torso    2. Sebaceous cyst        Patient scheduled for excision of cyst and lipoma.  Risk of bleeding infection recurrence discussed and patient agreeable wishes to proceed.          Electronically signed by Senait Ramirez MD  08/14/24

## 2024-08-14 ENCOUNTER — OFFICE VISIT (OUTPATIENT)
Dept: SURGERY | Facility: CLINIC | Age: 59
End: 2024-08-14
Payer: COMMERCIAL

## 2024-08-14 VITALS
SYSTOLIC BLOOD PRESSURE: 112 MMHG | DIASTOLIC BLOOD PRESSURE: 74 MMHG | HEART RATE: 75 BPM | TEMPERATURE: 98.6 F | OXYGEN SATURATION: 99 % | BODY MASS INDEX: 29.51 KG/M2 | WEIGHT: 210.8 LBS | HEIGHT: 71 IN

## 2024-08-14 DIAGNOSIS — L72.3 SEBACEOUS CYST: ICD-10-CM

## 2024-08-14 DIAGNOSIS — D17.1 LIPOMA OF TORSO: Primary | ICD-10-CM

## 2024-08-14 PROCEDURE — 99213 OFFICE O/P EST LOW 20 MIN: CPT | Performed by: SURGERY

## 2024-08-21 ENCOUNTER — TELEPHONE (OUTPATIENT)
Dept: SURGERY | Facility: CLINIC | Age: 59
End: 2024-08-21
Payer: COMMERCIAL

## 2024-08-21 NOTE — TELEPHONE ENCOUNTER
Pt is asking if the in office procedure needs to be s/c at [facility] that is s/c for 09/06/2024.

## 2024-08-21 NOTE — TELEPHONE ENCOUNTER
Pt notified that the procedure will be done in office due to the size of the masses being excised.

## 2024-09-04 NOTE — PROGRESS NOTES
Location:  chest wall and right side    Procedure: #1.  Excision 4.5 cm chest wall cyst with layered closure #2.  Excision 3.5 cm lipoma abdominal wall      I recommend excision. Procedure and the risks and benefits were explained including bleeding and infection. The patient understands these and wishes to proceed.     The patient was brought to the procedure room. Consent and time out were performed. The area was prepped and draped in the usual fashion. 1% lidocaine with epinephrine was infused locally. An ellyptical incision was made around the lesion. Full thickness excision was performed. The lesion size was 4.5 cm and 3.5 cm. The wound was closed in layers with interrupted simple vicryl and Nylon for the skin. Wound closure size was 4.5 cm. There were no complications and the patient tolerated the procedure well. Hemostasis was well controlled with pressure and there was minimal blood loss. Wound instructions were given.

## 2024-09-06 ENCOUNTER — PROCEDURE VISIT (OUTPATIENT)
Dept: SURGERY | Facility: CLINIC | Age: 59
End: 2024-09-06
Payer: COMMERCIAL

## 2024-09-06 VITALS
HEIGHT: 71 IN | WEIGHT: 210 LBS | OXYGEN SATURATION: 96 % | TEMPERATURE: 99.1 F | HEART RATE: 58 BPM | BODY MASS INDEX: 29.4 KG/M2

## 2024-09-06 DIAGNOSIS — L72.3 SEBACEOUS CYST: ICD-10-CM

## 2024-09-06 DIAGNOSIS — D17.1 LIPOMA OF TORSO: Primary | ICD-10-CM

## 2024-09-16 ENCOUNTER — OFFICE VISIT (OUTPATIENT)
Dept: SURGERY | Facility: CLINIC | Age: 59
End: 2024-09-16
Payer: COMMERCIAL

## 2024-09-16 VITALS
BODY MASS INDEX: 29.4 KG/M2 | WEIGHT: 210 LBS | HEART RATE: 90 BPM | OXYGEN SATURATION: 96 % | HEIGHT: 71 IN | TEMPERATURE: 98.7 F

## 2024-09-16 DIAGNOSIS — Z48.89 POSTOPERATIVE VISIT: Primary | ICD-10-CM

## 2024-09-16 PROCEDURE — 99024 POSTOP FOLLOW-UP VISIT: CPT | Performed by: SURGERY

## 2025-04-15 ENCOUNTER — OFFICE VISIT (OUTPATIENT)
Dept: PULMONOLOGY | Facility: CLINIC | Age: 60
End: 2025-04-15
Payer: COMMERCIAL

## 2025-04-15 VITALS
DIASTOLIC BLOOD PRESSURE: 74 MMHG | HEART RATE: 86 BPM | SYSTOLIC BLOOD PRESSURE: 124 MMHG | OXYGEN SATURATION: 98 % | WEIGHT: 219.4 LBS | BODY MASS INDEX: 30.72 KG/M2 | RESPIRATION RATE: 18 BRPM | HEIGHT: 71 IN

## 2025-04-15 DIAGNOSIS — J45.40 MODERATE PERSISTENT ASTHMA WITHOUT COMPLICATION: ICD-10-CM

## 2025-04-15 DIAGNOSIS — R06.83 SNORING: Primary | ICD-10-CM

## 2025-04-15 DIAGNOSIS — G47.19 EXCESSIVE DAYTIME SLEEPINESS: ICD-10-CM

## 2025-04-15 PROCEDURE — 95012 NITRIC OXIDE EXP GAS DETER: CPT | Performed by: INTERNAL MEDICINE

## 2025-04-15 RX ORDER — ALBUTEROL SULFATE 90 UG/1
2 INHALANT RESPIRATORY (INHALATION) EVERY 4 HOURS PRN
Qty: 18 G | Refills: 5 | Status: SHIPPED | OUTPATIENT
Start: 2025-04-15

## 2025-04-15 RX ORDER — FLUTICASONE PROPIONATE AND SALMETEROL 250; 50 UG/1; UG/1
1 POWDER RESPIRATORY (INHALATION)
Qty: 180 EACH | Refills: 2 | Status: SHIPPED | OUTPATIENT
Start: 2025-04-15

## 2025-04-15 RX ORDER — LORATADINE 10 MG/1
CAPSULE, LIQUID FILLED ORAL
COMMUNITY

## 2025-04-15 NOTE — PROGRESS NOTES
CONSULT NOTE    Requested by:   Peter Babb MD Long, Herbert W, MD      Chief Complaint   Patient presents with    Sleeping Problem    Consult       Subjective:  Tangela Zhong is a 60 y.o. male.   Patient came in today for evaluation of possible sleep apnea. Patient says that for the past few years he snores loudly     he has woken up in the middle of the night gasping for breath and sometimes with a choking sensation. Also, the patient's family notes that he has occasional pauses in the breathing.     he feels that he doesn't get restful night sleep and his quality has diminished considerably. he does feel sleepy watching TV and reading a book.      he is not complaining of occasional headaches.     Patient's sleep schedule was reviewed. he goes to bed around 11 PM and wakes up in the morning around 5:30 AM.     There is no known family history of sleep apnea    his Epsworth Sleepiness score was 4 /24.     he drinks 2-3 cups/cans of caffeinated drinks per day.    Upon questioning he is complaining of shortness of breath. Patient says that for the past few months, he has had shortness of breath.     Patient has had decreased exercise capacity that has been progressive for the past few years. Patient also says that he brings up phlegm in the morning along with cough.     Patient also notes having progressive worsening in his ability to walk up the hill or walk up a flight of stairs.     The patient has dogs at home. There seems to be a seasonal variation to the symptoms.    he never smoked.    The patient was exposed to smoking in the form of his parents who smoked heavily indoors.    Family history of lung diseases POSITIVE. COPD in father & mother    The following portions of the patient's history were reviewed and updated as appropriate: allergies, current medications, past family history, past medical history, past social history, and past surgical history.    Review of Systems   HENT:  Positive for  "sinus pressure. Negative for sneezing and sore throat.    Respiratory:  Positive for wheezing. Negative for cough, chest tightness and shortness of breath.    Psychiatric/Behavioral:  Positive for sleep disturbance.    All other systems reviewed and are negative.      Past Medical History:   Diagnosis Date    Asthma sporadically through adulthood    Cholelithiasis 2002    Removed    Eczema     GI (gastrointestinal bleed)     Hyperlipidemia     Rectal bleeding 2000    Hemorrhoids    Ringing in the ears, bilateral        Social History     Tobacco Use    Smoking status: Never     Passive exposure: Never    Smokeless tobacco: Current     Types: Snuff    Tobacco comments:     Smokeless non flavored   Substance Use Topics    Alcohol use: Yes     Comment: Rarely         Objective:  Visit Vitals  /74   Pulse 86   Resp 18   Ht 180.3 cm (70.98\")   Wt 99.5 kg (219 lb 6.4 oz)   SpO2 98%   BMI 30.61 kg/m²       BMI Readings from Last 8 Encounters:   04/15/25 30.61 kg/m²   09/16/24 29.30 kg/m²   09/06/24 29.30 kg/m²   08/14/24 29.41 kg/m²   05/30/24 29.84 kg/m²   05/20/24 29.44 kg/m²   05/08/23 28.93 kg/m²   09/30/22 28.87 kg/m²       Physical Exam  Vitals reviewed.   Constitutional:       Appearance: He is well-developed.   HENT:      Head: Atraumatic.      Mouth/Throat:      Mouth: Mucous membranes are moist.      Comments: Oropharynx was crowded.  Eyes:      Pupils: Pupils are equal, round, and reactive to light.   Neck:      Thyroid: No thyromegaly.      Vascular: No JVD.      Trachea: No tracheal deviation.   Cardiovascular:      Rate and Rhythm: Normal rate and regular rhythm.   Pulmonary:      Effort: Pulmonary effort is normal. No respiratory distress.      Breath sounds: Normal breath sounds. No wheezing.   Musculoskeletal:      Right lower leg: No edema.      Left lower leg: No edema.      Comments: Gait was normal.   Skin:     General: Skin is warm and dry.   Neurological:      Mental Status: He is alert and " oriented to person, place, and time.           Assessment/Plan:  Diagnoses and all orders for this visit:    1. Snoring (Primary)  -     Home Sleep Study; Future    2. Excessive daytime sleepiness  -     Home Sleep Study; Future    3. Moderate persistent asthma without complication  -     Nitric Oxide  -     Peak Flow; Future  -     Spirometry With Bronchodilator; Future    Other orders  -     albuterol sulfate HFA (Ventolin HFA) 108 (90 Base) MCG/ACT inhaler; Inhale 2 puffs Every 4 (Four) Hours As Needed for Wheezing or Shortness of Air.  Dispense: 18 g; Refill: 5  -     Fluticasone-Salmeterol (Wixela Inhub) 250-50 MCG/ACT DISKUS; Inhale 1 puff 2 (Two) Times a Day. Rinse mouth with water after use.  Dispense: 180 each; Refill: 2        Return in about 4 months (around 8/6/2025) for Recheck, Spirometry F/U, Sleep study, For Amanda Norris).    DISCUSSION(if any):  Laboratory workup also showed   Lab Results   Component Value Date    HGB 15.7 07/12/2018    HGB 16.1 08/11/2014   ,   Lab Results   Component Value Date    HCT 46.4 07/12/2018    HCT 47 08/11/2014       Lab Results   Component Value Date    EOSABS 0.90 (H) 08/11/2014    & Laboratory workup also showed   Lab Results   Component Value Date    CO2 24 07/12/2018    CO2 25 (L) 08/11/2014   ,   Lab Results   Component Value Date    HGBA1C 5.2 04/17/2023    HGBA1C 5.2 07/12/2018    HGBA1C 4.9 08/11/2014   ,   Lab Results   Component Value Date    TSH 4.14 08/11/2014     FeNO level was 30 today.    Peak flow was 510 LPM today.    ===========================  ===========================    Sleep questionnaire was reviewed with the patient    The pathophysiology of sleep apnea was discussed, with the patient.     We will encourage him to schedule the sleep study soon.     The patient was made aware of the limitation of the home sleep study, whereby it may underestimate the true AHI and also carries a low sensitivity.  I have informed him that even if the home  sleep study is negative, we may suggest an in lab sleep study to completely and definitively rule out/in sleep apnea.  The patient has understood.  This was communicated to the patient, in case home study is to be requested.    The patient is agreeable to try CPAP/BiPAP, if needed.     I had a detailed discussion with the patient regarding his symptoms that are very suggestive of asthma    Orders as above.    The patient was started on Wixela with instructions to rinse his mouth with water after use.     Other contributing factors may also need to be treated and this will be discussed with the patient, if and when applicable    Patient was advised to use rescue inhaler for when necessary purposes    Patient was also advised to keep a log of the use of rescue inhaler.    Patient was given reading material.      Dictated utilizing Dragon dictation.    This document was electronically signed by Sabina Shahid MD on 04/15/25 at 15:25 EDT

## 2025-04-17 ENCOUNTER — PATIENT ROUNDING (BHMG ONLY) (OUTPATIENT)
Dept: PULMONOLOGY | Facility: CLINIC | Age: 60
End: 2025-04-17
Payer: COMMERCIAL

## 2025-04-17 DIAGNOSIS — J45.40 MODERATE PERSISTENT ASTHMA WITHOUT COMPLICATION: ICD-10-CM

## 2025-05-27 ENCOUNTER — HOSPITAL ENCOUNTER (OUTPATIENT)
Dept: SLEEP MEDICINE | Facility: HOSPITAL | Age: 60
Discharge: HOME | End: 2025-05-27
Admitting: INTERNAL MEDICINE
Payer: COMMERCIAL

## 2025-05-27 DIAGNOSIS — G47.19 EXCESSIVE DAYTIME SLEEPINESS: ICD-10-CM

## 2025-05-27 DIAGNOSIS — R06.83 SNORING: ICD-10-CM

## 2025-05-27 PROCEDURE — 95800 SLP STDY UNATTENDED: CPT

## 2025-05-27 RX ORDER — TIOTROPIUM BROMIDE INHALATION SPRAY 3.12 UG/1
2 SPRAY, METERED RESPIRATORY (INHALATION) DAILY
Qty: 4 G | Refills: 5 | Status: SHIPPED | OUTPATIENT
Start: 2025-05-27

## 2025-06-03 ENCOUNTER — TELEPHONE (OUTPATIENT)
Dept: PULMONOLOGY | Facility: CLINIC | Age: 60
End: 2025-06-03
Payer: COMMERCIAL

## 2025-06-03 DIAGNOSIS — G47.33 OSA (OBSTRUCTIVE SLEEP APNEA): Primary | ICD-10-CM

## 2025-06-03 NOTE — TELEPHONE ENCOUNTER
Patient made aware of sleep results. [facility] F[facility]U 8-18-25.

## 2025-07-01 ENCOUNTER — TELEPHONE (OUTPATIENT)
Dept: PULMONOLOGY | Facility: CLINIC | Age: 60
End: 2025-07-01
Payer: COMMERCIAL

## 2025-07-01 NOTE — TELEPHONE ENCOUNTER
[facility] FOR PT, 8/18/25 APPT HAS BEEN CANCELLED PER PROVIDER BEING OUT OF THE OFFICE. WE WILL CALL AND RESCHEDULE ONCE WE ARE GIVEN AN APPT SLOT.

## 2025-07-07 NOTE — TELEPHONE ENCOUNTER
Caller: OSBALDO FERNANDEZ     Relationship to patient: SPOUSE     Best call back number: [phone]    PATIENT WIFE CALLING TO ATTEMPT TO RESCHEDULE TEL VISIT FOR SOONER. I ADVISED DR. NAM IS PRETTY BOOKED AND NO AVAILABILITY FOR SOONER. I ADVISED I COULD ADD TO WAIT LIST BUT SHE THEN ADVISED THAT DR. NAM DID RECOMMEND TO SCHEDULE FOR BIOPSY. PT SPOUSE IS WANTING TO SPEAK TO SOMEONE ON CLINICAL STAFF REGARDING BIOPSY, I ATTEMPTED TO WARM TRANSFER BUT NO ANSWER.       
solids

## 2025-07-09 ENCOUNTER — OFFICE VISIT (OUTPATIENT)
Dept: PULMONOLOGY | Facility: CLINIC | Age: 60
End: 2025-07-09
Payer: COMMERCIAL

## 2025-07-09 ENCOUNTER — LAB (OUTPATIENT)
Dept: LAB | Facility: HOSPITAL | Age: 60
End: 2025-07-09
Payer: COMMERCIAL

## 2025-07-09 VITALS
DIASTOLIC BLOOD PRESSURE: 76 MMHG | OXYGEN SATURATION: 98 % | RESPIRATION RATE: 16 BRPM | SYSTOLIC BLOOD PRESSURE: 124 MMHG | BODY MASS INDEX: 30.8 KG/M2 | HEIGHT: 71 IN | HEART RATE: 92 BPM | WEIGHT: 220 LBS

## 2025-07-09 DIAGNOSIS — J30.9 ALLERGIC RHINITIS, UNSPECIFIED SEASONALITY, UNSPECIFIED TRIGGER: ICD-10-CM

## 2025-07-09 DIAGNOSIS — J45.40 MODERATE PERSISTENT ASTHMA WITHOUT COMPLICATION: Primary | ICD-10-CM

## 2025-07-09 DIAGNOSIS — G47.33 OSA (OBSTRUCTIVE SLEEP APNEA): ICD-10-CM

## 2025-07-09 DIAGNOSIS — J45.40 MODERATE PERSISTENT ASTHMA WITHOUT COMPLICATION: ICD-10-CM

## 2025-07-09 LAB
BASOPHILS # BLD AUTO: 0.05 10*3/MM3 (ref 0–0.2)
BASOPHILS NFR BLD AUTO: 0.7 % (ref 0–1.5)
DEPRECATED RDW RBC AUTO: 43 FL (ref 37–54)
EOSINOPHIL # BLD AUTO: 0.9 10*3/MM3 (ref 0–0.4)
EOSINOPHIL NFR BLD AUTO: 11.7 % (ref 0.3–6.2)
ERYTHROCYTE [DISTWIDTH] IN BLOOD BY AUTOMATED COUNT: 13.6 % (ref 12.3–15.4)
HCT VFR BLD AUTO: 47.5 % (ref 37.5–51)
HGB BLD-MCNC: 15.6 G/DL (ref 13–17.7)
IMM GRANULOCYTES # BLD AUTO: 0.03 10*3/MM3 (ref 0–0.05)
IMM GRANULOCYTES NFR BLD AUTO: 0.4 % (ref 0–0.5)
LYMPHOCYTES # BLD AUTO: 1.77 10*3/MM3 (ref 0.7–3.1)
LYMPHOCYTES NFR BLD AUTO: 23.1 % (ref 19.6–45.3)
MCH RBC QN AUTO: 28.5 PG (ref 26.6–33)
MCHC RBC AUTO-ENTMCNC: 32.8 G/DL (ref 31.5–35.7)
MCV RBC AUTO: 86.8 FL (ref 79–97)
MONOCYTES # BLD AUTO: 0.6 10*3/MM3 (ref 0.1–0.9)
MONOCYTES NFR BLD AUTO: 7.8 % (ref 5–12)
NEUTROPHILS NFR BLD AUTO: 4.32 10*3/MM3 (ref 1.7–7)
NEUTROPHILS NFR BLD AUTO: 56.3 % (ref 42.7–76)
NRBC BLD AUTO-RTO: 0 /100 WBC (ref 0–0.2)
PLATELET # BLD AUTO: 214 10*3/MM3 (ref 140–450)
PMV BLD AUTO: 8.5 FL (ref 6–12)
RBC # BLD AUTO: 5.47 10*6/MM3 (ref 4.14–5.8)
WBC NRBC COR # BLD AUTO: 7.67 10*3/MM3 (ref 3.4–10.8)

## 2025-07-09 PROCEDURE — 86003 ALLG SPEC IGE CRUDE XTRC EA: CPT

## 2025-07-09 PROCEDURE — 82785 ASSAY OF IGE: CPT

## 2025-07-09 PROCEDURE — 36415 COLL VENOUS BLD VENIPUNCTURE: CPT

## 2025-07-09 PROCEDURE — 85025 COMPLETE CBC W/AUTO DIFF WBC: CPT

## 2025-07-09 PROCEDURE — 99214 OFFICE O/P EST MOD 30 MIN: CPT | Performed by: NURSE PRACTITIONER

## 2025-07-09 RX ORDER — TRIAMCINOLONE ACETONIDE 55 UG/1
SPRAY, METERED NASAL
COMMUNITY

## 2025-07-09 RX ORDER — AZELASTINE 1 MG/ML
1 SPRAY, METERED NASAL 2 TIMES DAILY PRN
Qty: 1 EACH | Refills: 5 | Status: SHIPPED | OUTPATIENT
Start: 2025-07-09

## 2025-07-09 RX ORDER — FLUTICASONE PROPIONATE AND SALMETEROL 250; 50 UG/1; UG/1
1 POWDER RESPIRATORY (INHALATION) 2 TIMES DAILY
COMMUNITY
Start: 2025-04-15 | End: 2025-07-09

## 2025-07-09 NOTE — PROGRESS NOTES
"Chief Complaint   Patient presents with    Breathing Problem    Follow-up         Subjective   Tangela Zhong is a 60 y.o. male.   Patient is here today for follow up of asthma, shortness of breath, and DAVID.     Patient says that since the last office visit he has had no recent exacerbations. he denies any emergency room visits or hospitalizations, due to his asthma.     He has been treated for bronchitis several times.     He tried wixela and it made his cough significantly. He was told to get a sample of spiriva and forgot to pick it up. Spiriva was too expensive at the pharmacy.     He c/o wheezing and SOB with activity. He has had this issue for years. He notices wheezing more in the evenings. He has not been using any inhaler in several weeks.     He c/o nasal drainage and constant clearing of his throat.    He feels SOB and wheezing worse in the fall.     He has not used LORENZO inhaler.     He uses nasal spray each morning and claritin at night.     His sleep study was reviewed and discussed with patient today. The sleep study showed severe DAVID with AHI 33/hour.    He has been set up with AutoPap 8-16.  He just received the machine last Thursday so has only had it to use for the last 5 days.  He feels he is sleeping better and breathing better when he wakes.       The following portions of the patient's history were reviewed and updated as appropriate: allergies, current medications, past family history, past medical history, past social history, and past surgical history.      Review of Systems   HENT:  Positive for postnasal drip and sinus pressure. Negative for sneezing and sore throat.    Respiratory:  Positive for cough, chest tightness, shortness of breath and wheezing.        Objective   Visit Vitals  /76   Pulse 92   Resp 16   Ht 180 cm (70.87\") Comment: pt reported   Wt 99.8 kg (220 lb)   SpO2 98%   BMI 30.80 kg/m²       Physical Exam  Vitals reviewed.   HENT:      Head: Atraumatic.      " Mouth/Throat:      Mouth: Mucous membranes are moist.   Eyes:      Extraocular Movements: Extraocular movements intact.   Cardiovascular:      Rate and Rhythm: Normal rate and regular rhythm.   Pulmonary:      Effort: Pulmonary effort is normal. No respiratory distress.      Breath sounds: Normal breath sounds. No wheezing.   Abdominal:      Comments: Obese abdomen.   Skin:     General: Skin is warm.   Neurological:      Mental Status: He is alert and oriented to person, place, and time.         Assessment & Plan   Diagnoses and all orders for this visit:    1. Moderate persistent asthma without complication (Primary)  -     IgE; Future  -     Allergens, Zone 8; Future  -     CBC Auto Differential; Future    2. DAVID (obstructive sleep apnea)    3. Allergic rhinitis, unspecified seasonality, unspecified trigger  -     IgE; Future  -     Allergens, Zone 8; Future  -     CBC Auto Differential; Future    Other orders  -     azelastine (ASTELIN) 0.1 % nasal spray; Administer 1 spray into the nostril(s) as directed by provider 2 (Two) Times a Day As Needed for Rhinitis or Allergies. Use in each nostril as directed  Dispense: 1 each; Refill: 5           Return in about 4 months (around 11/9/2025) for Recheck, For Dr. Shahid.    DISCUSSION (if any):  PFT today consistent with mild obstruction.    IgE, RAST, and CBC with differential ordered.    Absolute eosinophils from 2014 noted to be elevated.     Latest Reference Range & Units Most Recent   Eosinophils Absolute 0.00 - 0.70 THOUS 0.90 (H)  8/11/14 06:35   (H): Data is abnormally high      His symptoms of asthma are under poor control at this time. Will give sample of Spiriva 1.25  today. He has LORENZO to use as needed.     Will add Astelin to Nasacort and he should continue Claritin daily.    Patient's medications for underlying asthma were reviewed with him in great detail.    Any needed adjustments to his pulmonary medications, either for clinical or insurance coverage  reasons, have been made and are reflected in the orders.    Side effects of prescribed medications discussed with the patient.    Asthma action plan with discussed with him.    The patient was asked to call this office if the symptoms worsen.     Overall he has been very compliant with using the machine and is averaging 5 hours of use nightly.  Residual AHI 4.7/h. He just received the machine last Thursday so has only had it to use for the last 5 days.    He is currently using a modified fullface mask but has noticed significant leak and plans to change the mask to try the fullface mask.    Continue treatment with AutoPAP at a pressure of 8-16, with a full-face mask.    Patient's compliance data was reviewed and the compliance is greater than 70%. He has only had 5 days of machine use.     Humidification setup, hose and mask care discussed.    Weight loss advised.    Use every night for at least 4 hours stressed.       Dictated utilizing Dragon dictation.    This document was electronically signed by MYA Hernandez July 9, 2025  10:02 EDT

## 2025-07-14 LAB — IGE SERPL-ACNC: 777 IU/ML (ref 6–495)

## 2025-07-15 LAB
A ALTERNATA IGE QN: 1.57 KU/L
A FUMIGATUS IGE QN: 0.39 KU/L
AMER ROACH IGE QN: <0.1 KU/L
BAHIA GRASS IGE QN: 0.12 KU/L
BERMUDA GRASS IGE QN: <0.1 KU/L
BOXELDER IGE QN: 0.22 KU/L
C HERBARUM IGE QN: <0.1 KU/L
CAT DANDER IGE QN: 0.55 KU/L
CMN PIGWEED IGE QN: 0.16 KU/L
COMMON RAGWEED IGE QN: 0.18 KU/L
D FARINAE IGE QN: <0.1 KU/L
D PTERONYSS IGE QN: 0.12 KU/L
DOG DANDER IGE QN: 1.08 KU/L
ENGL PLANTAIN IGE QN: <0.1 KU/L
HAZELNUT POLN IGE QN: <0.1 KU/L
JOHNSON GRASS IGE QN: <0.1 KU/L
KENT BLUE GRASS IGE QN: <0.1 KU/L
LONDON PLANE IGE QN: 0.55 KU/L
M RACEMOSUS IGE QN: 0.17 KU/L
MT JUNIPER IGE QN: 0.96 KU/L
MUGWORT IGE QN: 0.89 KU/L
NETTLE IGE QN: 0.48 KU/L
P NOTATUM IGE QN: <0.1 KU/L
S BOTRYOSUM IGE QN: 0.93 KU/L
SERVICE CMNT-IMP: ABNORMAL
SHEEP SORREL IGE QN: 0.18 KU/L
SWEET GUM IGE QN: 0.42 KU/L
WHITE ELM IGE QN: 0.65 KU/L
WHITE HICKORY IGE QN: 0.15 KU/L
WHITE MULBERRY IGE QN: <0.1 KU/L
WHITE OAK IGE QN: 0.16 KU/L

## 2025-07-16 ENCOUNTER — RESULTS FOLLOW-UP (OUTPATIENT)
Dept: PULMONOLOGY | Facility: CLINIC | Age: 60
End: 2025-07-16
Payer: COMMERCIAL